# Patient Record
Sex: MALE | Race: ASIAN | NOT HISPANIC OR LATINO
[De-identification: names, ages, dates, MRNs, and addresses within clinical notes are randomized per-mention and may not be internally consistent; named-entity substitution may affect disease eponyms.]

---

## 2019-07-22 ENCOUNTER — APPOINTMENT (OUTPATIENT)
Dept: CARDIOLOGY | Facility: CLINIC | Age: 74
End: 2019-07-22

## 2019-07-22 PROBLEM — Z00.00 ENCOUNTER FOR PREVENTIVE HEALTH EXAMINATION: Status: ACTIVE | Noted: 2019-07-22

## 2021-04-09 ENCOUNTER — APPOINTMENT (OUTPATIENT)
Dept: UROLOGY | Facility: CLINIC | Age: 76
End: 2021-04-09
Payer: MEDICARE

## 2021-04-09 VITALS
OXYGEN SATURATION: 96 % | TEMPERATURE: 97.4 F | SYSTOLIC BLOOD PRESSURE: 147 MMHG | WEIGHT: 136.68 LBS | HEIGHT: 67 IN | BODY MASS INDEX: 21.45 KG/M2 | DIASTOLIC BLOOD PRESSURE: 66 MMHG | HEART RATE: 79 BPM | RESPIRATION RATE: 16 BRPM

## 2021-04-09 DIAGNOSIS — Z78.9 OTHER SPECIFIED HEALTH STATUS: ICD-10-CM

## 2021-04-09 DIAGNOSIS — Z80.49 FAMILY HISTORY OF MALIGNANT NEOPLASM OF OTHER GENITAL ORGANS: ICD-10-CM

## 2021-04-09 DIAGNOSIS — Z86.39 PERSONAL HISTORY OF OTHER ENDOCRINE, NUTRITIONAL AND METABOLIC DISEASE: ICD-10-CM

## 2021-04-09 DIAGNOSIS — Z86.79 PERSONAL HISTORY OF OTHER DISEASES OF THE CIRCULATORY SYSTEM: ICD-10-CM

## 2021-04-09 PROCEDURE — 99203 OFFICE O/P NEW LOW 30 MIN: CPT

## 2021-04-09 RX ORDER — FINASTERIDE 5 MG/1
5 TABLET, FILM COATED ORAL
Qty: 90 | Refills: 3 | Status: ACTIVE | COMMUNITY
Start: 2021-04-09 | End: 1900-01-01

## 2021-04-11 PROBLEM — Z80.49 FAMILY HISTORY OF CERVICAL CANCER: Status: ACTIVE | Noted: 2021-04-09

## 2021-04-11 PROBLEM — Z86.79 HISTORY OF HYPERTENSION: Status: RESOLVED | Noted: 2021-04-09 | Resolved: 2021-04-11

## 2021-04-11 PROBLEM — Z86.39 HISTORY OF DIABETES MELLITUS: Status: RESOLVED | Noted: 2021-04-09 | Resolved: 2021-04-11

## 2021-04-11 PROBLEM — Z78.9 NON-SMOKER: Status: ACTIVE | Noted: 2021-04-09

## 2021-04-11 PROBLEM — Z78.9 NO HISTORY OF ALCOHOL USE: Status: ACTIVE | Noted: 2021-04-09

## 2021-04-11 NOTE — ASSESSMENT
[FreeTextEntry1] : 74 yo  with BPH, acute urinary retention\par \par Natural history of BPH and bladder outlet obstruction reviewed, risks of progression, risks of detrussor myopathy/areflexic bladder, bladder stones, UTI, worsening symptoms, risk of retention and other issues. \par \par All treatment options were reviewed. This included surveillance, all medical therapeutic options, all outlet procedures including office based, TURP, bipolar TURP, button vaporization, thulium/holmium, suprapubic/retropubic simple (open, robotic) prostatectomy. \par \par All potential side effects of treatment were reviewed including, but not limited to: short term or permanent stress urinary incontinence and/or short term or permanent erectile dysfunction, penile shortening, rectal symptoms/pain, perineal pain, risks of conversion from MIS to open surgery discussed, bleeding//life-threatening hemorrhage, rectal injury requiring colostomy or delayed recognition leading to fistula, vascular/bowel/adjacent visceral organ injury, trocar/access injury, the possibility of recognized vs. unrecognized/delayed-recognition injury, risks of thermal/blunt/sharp/retraction injury, risks of DVT, PE, MI, death, risks of cardiopulmonary/anesthesia related complications, positional injury, infection/collection/abscess, wound complications/dehiscence/seroma/cellulitis, urinoma/fistula, ureteral injury/obstruction, bladder neck contracture, penile shortening, meatal stenosis, urethral stricture, prolonged vesicostomy or capsular leak, and other complications.\par \par - Will start patient on dual medical therapy - increase tamsulosin to 08mg and start finasteride\par - TOV in 10 days

## 2021-04-11 NOTE — PHYSICAL EXAM
[General Appearance - Well Developed] : well developed [General Appearance - Well Nourished] : well nourished [Normal Appearance] : normal appearance [Well Groomed] : well groomed [General Appearance - In No Acute Distress] : no acute distress [Edema] : no peripheral edema [Respiration, Rhythm And Depth] : normal respiratory rhythm and effort [Exaggerated Use Of Accessory Muscles For Inspiration] : no accessory muscle use [Abdomen Soft] : soft [Abdomen Tenderness] : non-tender [Costovertebral Angle Tenderness] : no ~M costovertebral angle tenderness [Urethral Meatus] : meatus normal [Penis Abnormality] : normal circumcised penis [Urinary Bladder Findings] : the bladder was normal on palpation [Scrotum] : the scrotum was normal [Epididymis] : the epididymides were normal [Testes Tenderness] : no tenderness of the testes [Testes Mass (___cm)] : there were no testicular masses [FreeTextEntry1] : Brooks draining clear yellow urine [Normal Station and Gait] : the gait and station were normal for the patient's age [] : no rash [No Focal Deficits] : no focal deficits [Oriented To Time, Place, And Person] : oriented to person, place, and time [Affect] : the affect was normal [Mood] : the mood was normal [Not Anxious] : not anxious [No Palpable Adenopathy] : no palpable adenopathy

## 2021-04-11 NOTE — REVIEW OF SYSTEMS
[Eyesight Problems] : eyesight problems [Pain during urination] : pain during urination [Urine retention] : urine retention [Wake up at night to urinate  How many times?  ___] : wakes up to urinate [unfilled] times during the night [Wait a long time to urinate] : waits a long time to urinate [Dizziness] : dizziness [Negative] : Heme/Lymph [FreeTextEntry3] : Frequent urination

## 2021-04-11 NOTE — HISTORY OF PRESENT ILLNESS
[FreeTextEntry1] : 76 yo Faroese speaking M presents with longstanding history of BPH\par Multiple episodes of acute urinary retention\par First episode was about 7-8 months ago\par Second episode was 3 months ago\par At that time, underwent TUMT in the office of an outside urologist (Dr. Hernandez)\par Obstructive LUTS didn't improve much afterwards\par Most recently about 2 days ago, wasn't able to urinate\par Went to urgent care who was able to place a daly catheter\par Throughout this whole time, has just been taking tamsulosin 0.4mg

## 2021-04-21 ENCOUNTER — APPOINTMENT (OUTPATIENT)
Age: 76
End: 2021-04-21
Payer: MEDICARE

## 2021-04-21 VITALS — TEMPERATURE: 98 F

## 2021-04-21 PROCEDURE — 51700 IRRIGATION OF BLADDER: CPT

## 2021-04-21 PROCEDURE — 99214 OFFICE O/P EST MOD 30 MIN: CPT | Mod: 25

## 2021-04-21 PROCEDURE — A4216: CPT | Mod: NC

## 2021-04-25 NOTE — ASSESSMENT
[FreeTextEntry1] : 76 yo M with BPH, urinary retention\par \par - FU in 1 week for repeat bladder scan\par - Continue dual medical therapy\par - Reviewed options again for his BPH including surgical intervention. Pt will continue to consider them.

## 2021-04-25 NOTE — PHYSICAL EXAM
[General Appearance - Well Developed] : well developed [General Appearance - Well Nourished] : well nourished [Normal Appearance] : normal appearance [Well Groomed] : well groomed [General Appearance - In No Acute Distress] : no acute distress [Abdomen Soft] : soft [Abdomen Tenderness] : non-tender [Costovertebral Angle Tenderness] : no ~M costovertebral angle tenderness [Urethral Meatus] : meatus normal [Penis Abnormality] : normal circumcised penis [Urinary Bladder Findings] : the bladder was normal on palpation [Scrotum] : the scrotum was normal [Epididymis] : the epididymides were normal [Testes Tenderness] : no tenderness of the testes [Testes Mass (___cm)] : there were no testicular masses [FreeTextEntry1] : Brooks draining clear yellow urine [Edema] : no peripheral edema [] : no respiratory distress [Respiration, Rhythm And Depth] : normal respiratory rhythm and effort [Exaggerated Use Of Accessory Muscles For Inspiration] : no accessory muscle use [Oriented To Time, Place, And Person] : oriented to person, place, and time [Affect] : the affect was normal [Mood] : the mood was normal [Not Anxious] : not anxious [Normal Station and Gait] : the gait and station were normal for the patient's age [No Focal Deficits] : no focal deficits [No Palpable Adenopathy] : no palpable adenopathy

## 2021-04-25 NOTE — HISTORY OF PRESENT ILLNESS
[FreeTextEntry1] : 76 yo Spanish speaking M presents with longstanding history of BPH\par Multiple episodes of acute urinary retention\par First episode was about 7-8 months ago\par Second episode was 3 months ago\par At that time, underwent TUMT in the office of an outside urologist (Dr. Hernandez)\par Obstructive LUTS didn't improve much afterwards\par Most recently about 2 days ago, wasn't able to urinate\par Went to urgent care who was able to place a daly catheter\par Throughout this whole time, has just been taking tamsulosin 0.4mg\par \par 4/21/21 Interval history: No issues since last visit\par Started finasteride and increased tamsulosin\par Passed TOV today

## 2021-05-05 ENCOUNTER — APPOINTMENT (OUTPATIENT)
Dept: UROLOGY | Facility: CLINIC | Age: 76
End: 2021-05-05
Payer: MEDICARE

## 2021-05-05 VITALS
SYSTOLIC BLOOD PRESSURE: 153 MMHG | HEART RATE: 66 BPM | TEMPERATURE: 97.5 F | RESPIRATION RATE: 16 BRPM | OXYGEN SATURATION: 97 % | DIASTOLIC BLOOD PRESSURE: 73 MMHG

## 2021-05-05 PROCEDURE — 99214 OFFICE O/P EST MOD 30 MIN: CPT | Mod: 25

## 2021-05-05 PROCEDURE — 52000 CYSTOURETHROSCOPY: CPT

## 2021-05-07 NOTE — HISTORY OF PRESENT ILLNESS
[FreeTextEntry1] : 74 yo Polish speaking M presents with longstanding history of BPH\par Multiple episodes of acute urinary retention\par First episode was about 7-8 months ago\par Second episode was 3 months ago\par At that time, underwent TUMT in the office of an outside urologist (Dr. Hernandez)\par Obstructive LUTS didn't improve much afterwards\par Most recently about 2 days ago, wasn't able to urinate\par Went to urgent care who was able to place a daly catheter\par Throughout this whole time, has just been taking tamsulosin 0.4mg\par \par 4/21/21 Interval history: No issues since last visit\par Started finasteride and increased tamsulosin\par Passed TOV today\par \par 5/5/21 Interval history: The day after pt passed TOV, went into retention again\par Went to ER and had catheter replaced\par Cystoscopy today confirmed enlarged prostate with intravesical component\par

## 2021-05-07 NOTE — ASSESSMENT
[FreeTextEntry1] : 76 yo M with BPH, urinary retention\par \par - Reviewed today's cysto findings with pt\par - Discussed options with pt. Pt has been dealing intermittently with urinary retention for almost a year now. Natural history of BPH and bladder outlet obstruction reviewed, risks of progression, risks of detrussor myopathy/areflexic bladder, bladder stones, UTI, worsening symptoms, risk of retention and other issues. \par All treatment options were reviewed. This included surveillance, all medical therapeutic options, all outlet procedures including office based, TURP, bipolar TURP, button vaporization, thulium/holmium, suprapubic/retropubic simple (open, robotic) prostatectomy. \par \par All potential side effects of treatment were reviewed including, but not limited to: short term or permanent stress urinary incontinence and/or short term or permanent erectile dysfunction, penile shortening, rectal symptoms/pain, perineal pain, risks of conversion from MIS to open surgery discussed, bleeding//life-threatening hemorrhage, rectal injury requiring colostomy or delayed recognition leading to fistula, vascular/bowel/adjacent visceral organ injury, trocar/access injury, the possibility of recognized vs. unrecognized/delayed-recognition injury, risks of thermal/blunt/sharp/retraction injury, risks of DVT, PE, MI, death, risks of cardiopulmonary/anesthesia related complications, positional injury, infection/collection/abscess, wound complications/dehiscence/seroma/cellulitis, urinoma/fistula, ureteral injury/obstruction, bladder neck contracture, penile shortening, meatal stenosis, urethral stricture, prolonged vesicostomy or capsular leak, and other complications.\par \par Will schedule TURP in the near future\par - Medical and cardiac clearance

## 2021-05-07 NOTE — PHYSICAL EXAM
[General Appearance - Well Developed] : well developed [General Appearance - Well Nourished] : well nourished [Normal Appearance] : normal appearance [Well Groomed] : well groomed [General Appearance - In No Acute Distress] : no acute distress [Abdomen Soft] : soft [Abdomen Tenderness] : non-tender [Costovertebral Angle Tenderness] : no ~M costovertebral angle tenderness [Urethral Meatus] : meatus normal [Penis Abnormality] : normal circumcised penis [Urinary Bladder Findings] : the bladder was normal on palpation [Scrotum] : the scrotum was normal [Epididymis] : the epididymides were normal [Testes Tenderness] : no tenderness of the testes [Testes Mass (___cm)] : there were no testicular masses [Prostate Tenderness] : the prostate was not tender [No Prostate Nodules] : no prostate nodules [Prostate Size ___ gm] : prostate size [unfilled] gm [FreeTextEntry1] : Brooks draining clear yellow urine [Edema] : no peripheral edema [] : no respiratory distress [Respiration, Rhythm And Depth] : normal respiratory rhythm and effort [Exaggerated Use Of Accessory Muscles For Inspiration] : no accessory muscle use [Oriented To Time, Place, And Person] : oriented to person, place, and time [Affect] : the affect was normal [Mood] : the mood was normal [Not Anxious] : not anxious [Normal Station and Gait] : the gait and station were normal for the patient's age [No Focal Deficits] : no focal deficits [No Palpable Adenopathy] : no palpable adenopathy

## 2021-05-13 ENCOUNTER — OUTPATIENT (OUTPATIENT)
Dept: OUTPATIENT SERVICES | Facility: HOSPITAL | Age: 76
LOS: 1 days | End: 2021-05-13
Payer: MEDICARE

## 2021-05-13 VITALS
TEMPERATURE: 98 F | HEART RATE: 90 BPM | SYSTOLIC BLOOD PRESSURE: 150 MMHG | HEIGHT: 64 IN | WEIGHT: 130.07 LBS | RESPIRATION RATE: 16 BRPM | OXYGEN SATURATION: 98 % | DIASTOLIC BLOOD PRESSURE: 69 MMHG

## 2021-05-13 DIAGNOSIS — Z01.818 ENCOUNTER FOR OTHER PREPROCEDURAL EXAMINATION: ICD-10-CM

## 2021-05-13 DIAGNOSIS — N40.1 BENIGN PROSTATIC HYPERPLASIA WITH LOWER URINARY TRACT SYMPTOMS: ICD-10-CM

## 2021-05-13 DIAGNOSIS — I10 ESSENTIAL (PRIMARY) HYPERTENSION: ICD-10-CM

## 2021-05-13 DIAGNOSIS — E11.9 TYPE 2 DIABETES MELLITUS WITHOUT COMPLICATIONS: ICD-10-CM

## 2021-05-13 DIAGNOSIS — Z90.49 ACQUIRED ABSENCE OF OTHER SPECIFIED PARTS OF DIGESTIVE TRACT: Chronic | ICD-10-CM

## 2021-05-13 LAB
A1C WITH ESTIMATED AVERAGE GLUCOSE RESULT: 6.3 % — HIGH (ref 4–5.6)
ANION GAP SERPL CALC-SCNC: 14 MMOL/L — SIGNIFICANT CHANGE UP (ref 5–17)
BLD GP AB SCN SERPL QL: NEGATIVE — SIGNIFICANT CHANGE UP
BUN SERPL-MCNC: 15 MG/DL — SIGNIFICANT CHANGE UP (ref 7–23)
CALCIUM SERPL-MCNC: 9.7 MG/DL — SIGNIFICANT CHANGE UP (ref 8.4–10.5)
CHLORIDE SERPL-SCNC: 103 MMOL/L — SIGNIFICANT CHANGE UP (ref 96–108)
CO2 SERPL-SCNC: 21 MMOL/L — LOW (ref 22–31)
CREAT SERPL-MCNC: 0.76 MG/DL — SIGNIFICANT CHANGE UP (ref 0.5–1.3)
ESTIMATED AVERAGE GLUCOSE: 134 MG/DL — HIGH (ref 68–114)
GLUCOSE SERPL-MCNC: 129 MG/DL — HIGH (ref 70–99)
HCT VFR BLD CALC: 45.9 % — SIGNIFICANT CHANGE UP (ref 39–50)
HGB BLD-MCNC: 15.9 G/DL — SIGNIFICANT CHANGE UP (ref 13–17)
MCHC RBC-ENTMCNC: 29.9 PG — SIGNIFICANT CHANGE UP (ref 27–34)
MCHC RBC-ENTMCNC: 34.6 GM/DL — SIGNIFICANT CHANGE UP (ref 32–36)
MCV RBC AUTO: 86.4 FL — SIGNIFICANT CHANGE UP (ref 80–100)
NRBC # BLD: 0 /100 WBCS — SIGNIFICANT CHANGE UP (ref 0–0)
PLATELET # BLD AUTO: 241 K/UL — SIGNIFICANT CHANGE UP (ref 150–400)
POTASSIUM SERPL-MCNC: 3.5 MMOL/L — SIGNIFICANT CHANGE UP (ref 3.5–5.3)
POTASSIUM SERPL-SCNC: 3.5 MMOL/L — SIGNIFICANT CHANGE UP (ref 3.5–5.3)
RBC # BLD: 5.31 M/UL — SIGNIFICANT CHANGE UP (ref 4.2–5.8)
RBC # FLD: 13.1 % — SIGNIFICANT CHANGE UP (ref 10.3–14.5)
RH IG SCN BLD-IMP: POSITIVE — SIGNIFICANT CHANGE UP
SODIUM SERPL-SCNC: 138 MMOL/L — SIGNIFICANT CHANGE UP (ref 135–145)
WBC # BLD: 7.43 K/UL — SIGNIFICANT CHANGE UP (ref 3.8–10.5)
WBC # FLD AUTO: 7.43 K/UL — SIGNIFICANT CHANGE UP (ref 3.8–10.5)

## 2021-05-13 PROCEDURE — 85027 COMPLETE CBC AUTOMATED: CPT

## 2021-05-13 PROCEDURE — G0463: CPT

## 2021-05-13 PROCEDURE — 86850 RBC ANTIBODY SCREEN: CPT

## 2021-05-13 PROCEDURE — 80048 BASIC METABOLIC PNL TOTAL CA: CPT

## 2021-05-13 PROCEDURE — 87086 URINE CULTURE/COLONY COUNT: CPT

## 2021-05-13 PROCEDURE — 83036 HEMOGLOBIN GLYCOSYLATED A1C: CPT

## 2021-05-13 PROCEDURE — 86900 BLOOD TYPING SEROLOGIC ABO: CPT

## 2021-05-13 PROCEDURE — 86901 BLOOD TYPING SEROLOGIC RH(D): CPT

## 2021-05-13 RX ORDER — CHLORHEXIDINE GLUCONATE 213 G/1000ML
1 SOLUTION TOPICAL ONCE
Refills: 0 | Status: DISCONTINUED | OUTPATIENT
Start: 2021-05-18 | End: 2021-05-18

## 2021-05-13 RX ORDER — OLMESARTAN MEDOXOMIL 5 MG/1
1 TABLET, FILM COATED ORAL
Qty: 0 | Refills: 0 | DISCHARGE

## 2021-05-13 RX ORDER — SODIUM CHLORIDE 9 MG/ML
3 INJECTION INTRAMUSCULAR; INTRAVENOUS; SUBCUTANEOUS EVERY 8 HOURS
Refills: 0 | Status: DISCONTINUED | OUTPATIENT
Start: 2021-05-18 | End: 2021-05-18

## 2021-05-13 RX ORDER — CEFAZOLIN SODIUM 1 G
2000 VIAL (EA) INJECTION ONCE
Refills: 0 | Status: DISCONTINUED | OUTPATIENT
Start: 2021-05-18 | End: 2021-05-18

## 2021-05-13 NOTE — H&P PST ADULT - NSICDXPROBLEM_GEN_ALL_CORE_FT
PROBLEM DIAGNOSES  Problem: Enlarged prostate with lower urinary tract symptoms (LUTS)  Assessment and Plan: Labs  Pre Op education discussed       PROBLEM DIAGNOSES  Problem: Enlarged prostate with lower urinary tract symptoms (LUTS)  Assessment and Plan: Labs  Pre Op education discussed    Problem: DM (diabetes mellitus)  Assessment and Plan: Last dose of Metformin 05/16/2021 PM    Problem: HTN (hypertension)  Assessment and Plan: Continue on antihypertensive medication

## 2021-05-13 NOTE — H&P PST ADULT - NSICDXPASTMEDICALHX_GEN_ALL_CORE_FT
PAST MEDICAL HISTORY:  DM (diabetes mellitus), type 2     History of BPH     HTN (hypertension)

## 2021-05-13 NOTE — H&P PST ADULT - HISTORY OF PRESENT ILLNESS
74y/o male pt with hx BPH, HTN, DM with c/o urinary retention    Scheduled for transurethral resection of prostate on 05/18/2021    Denies Recent travel, Exposure or Covid symptoms  Covid PCR Test on 05/15/2021  74 yo Croatian speaking M presents with longstanding history of HTN, DM, BPH c/o multiple episodes of acute urinary retention since 8 months ago , underwent TUMT in the office of an outside urologist (Dr. Hernandez). Went to urgent care as was not able to urinate .They placed a daly catheter. The day after pt passed TOV, went into retention again. Went to ER and had catheter replaced . Cystoscopy  confirmed enlarged prostate with intravesical component.     Scheduled for transurethral resection of prostate on 05/18/2021    Denies Recent travel, Exposure or Covid symptoms  Covid PCR Test on 05/15/2021

## 2021-05-15 ENCOUNTER — OUTPATIENT (OUTPATIENT)
Dept: OUTPATIENT SERVICES | Facility: HOSPITAL | Age: 76
LOS: 1 days | End: 2021-05-15
Payer: MEDICARE

## 2021-05-15 DIAGNOSIS — Z11.52 ENCOUNTER FOR SCREENING FOR COVID-19: ICD-10-CM

## 2021-05-15 DIAGNOSIS — Z90.49 ACQUIRED ABSENCE OF OTHER SPECIFIED PARTS OF DIGESTIVE TRACT: Chronic | ICD-10-CM

## 2021-05-15 LAB
CULTURE RESULTS: SIGNIFICANT CHANGE UP
SARS-COV-2 RNA SPEC QL NAA+PROBE: SIGNIFICANT CHANGE UP
SPECIMEN SOURCE: SIGNIFICANT CHANGE UP

## 2021-05-15 PROCEDURE — C9803: CPT

## 2021-05-15 PROCEDURE — U0003: CPT

## 2021-05-15 PROCEDURE — U0005: CPT

## 2021-05-17 ENCOUNTER — TRANSCRIPTION ENCOUNTER (OUTPATIENT)
Age: 76
End: 2021-05-17

## 2021-05-18 ENCOUNTER — APPOINTMENT (OUTPATIENT)
Age: 76
End: 2021-05-18

## 2021-05-18 ENCOUNTER — OUTPATIENT (OUTPATIENT)
Dept: OUTPATIENT SERVICES | Facility: HOSPITAL | Age: 76
LOS: 1 days | End: 2021-05-18
Payer: MEDICARE

## 2021-05-18 ENCOUNTER — RESULT REVIEW (OUTPATIENT)
Age: 76
End: 2021-05-18

## 2021-05-18 VITALS
SYSTOLIC BLOOD PRESSURE: 135 MMHG | TEMPERATURE: 98 F | WEIGHT: 130.07 LBS | HEART RATE: 82 BPM | DIASTOLIC BLOOD PRESSURE: 77 MMHG | OXYGEN SATURATION: 98 % | HEIGHT: 64 IN | RESPIRATION RATE: 14 BRPM

## 2021-05-18 DIAGNOSIS — N40.1 BENIGN PROSTATIC HYPERPLASIA WITH LOWER URINARY TRACT SYMPTOMS: ICD-10-CM

## 2021-05-18 DIAGNOSIS — Z90.49 ACQUIRED ABSENCE OF OTHER SPECIFIED PARTS OF DIGESTIVE TRACT: Chronic | ICD-10-CM

## 2021-05-18 LAB
GLUCOSE BLDC GLUCOMTR-MCNC: 130 MG/DL — HIGH (ref 70–99)
GLUCOSE BLDC GLUCOMTR-MCNC: 153 MG/DL — HIGH (ref 70–99)
GLUCOSE BLDC GLUCOMTR-MCNC: 188 MG/DL — HIGH (ref 70–99)
RH IG SCN BLD-IMP: POSITIVE — SIGNIFICANT CHANGE UP

## 2021-05-18 PROCEDURE — 52601 PROSTATECTOMY (TURP): CPT

## 2021-05-18 PROCEDURE — 88305 TISSUE EXAM BY PATHOLOGIST: CPT | Mod: 26

## 2021-05-18 RX ORDER — LOSARTAN POTASSIUM 100 MG/1
50 TABLET, FILM COATED ORAL DAILY
Refills: 0 | Status: DISCONTINUED | OUTPATIENT
Start: 2021-05-18 | End: 2021-06-01

## 2021-05-18 RX ORDER — INSULIN LISPRO 100/ML
VIAL (ML) SUBCUTANEOUS
Refills: 0 | Status: DISCONTINUED | OUTPATIENT
Start: 2021-05-18 | End: 2021-06-01

## 2021-05-18 RX ORDER — SODIUM CHLORIDE 9 MG/ML
1000 INJECTION, SOLUTION INTRAVENOUS
Refills: 0 | Status: DISCONTINUED | OUTPATIENT
Start: 2021-05-18 | End: 2021-06-01

## 2021-05-18 RX ORDER — TAMSULOSIN HYDROCHLORIDE 0.4 MG/1
0.8 CAPSULE ORAL AT BEDTIME
Refills: 0 | Status: DISCONTINUED | OUTPATIENT
Start: 2021-05-18 | End: 2021-06-01

## 2021-05-18 RX ORDER — ACETAMINOPHEN 500 MG
650 TABLET ORAL EVERY 4 HOURS
Refills: 0 | Status: DISCONTINUED | OUTPATIENT
Start: 2021-05-18 | End: 2021-06-01

## 2021-05-18 RX ORDER — FINASTERIDE 5 MG/1
5 TABLET, FILM COATED ORAL DAILY
Refills: 0 | Status: DISCONTINUED | OUTPATIENT
Start: 2021-05-18 | End: 2021-06-01

## 2021-05-18 RX ORDER — METFORMIN HYDROCHLORIDE 850 MG/1
1 TABLET ORAL
Qty: 0 | Refills: 0 | DISCHARGE

## 2021-05-18 RX ORDER — POLYETHYLENE GLYCOL 3350 17 G/17G
17 POWDER, FOR SOLUTION ORAL DAILY
Refills: 0 | Status: DISCONTINUED | OUTPATIENT
Start: 2021-05-18 | End: 2021-06-01

## 2021-05-18 RX ORDER — HYDROMORPHONE HYDROCHLORIDE 2 MG/ML
0.25 INJECTION INTRAMUSCULAR; INTRAVENOUS; SUBCUTANEOUS
Refills: 0 | Status: DISCONTINUED | OUTPATIENT
Start: 2021-05-18 | End: 2021-05-19

## 2021-05-18 RX ORDER — CEPHALEXIN 500 MG
1 CAPSULE ORAL
Qty: 9 | Refills: 0
Start: 2021-05-18 | End: 2021-05-20

## 2021-05-18 RX ORDER — AMLODIPINE BESYLATE 2.5 MG/1
1 TABLET ORAL
Qty: 0 | Refills: 0 | DISCHARGE

## 2021-05-18 RX ORDER — FINASTERIDE 5 MG/1
1 TABLET, FILM COATED ORAL
Qty: 0 | Refills: 0 | DISCHARGE

## 2021-05-18 RX ORDER — TAMSULOSIN HYDROCHLORIDE 0.4 MG/1
2 CAPSULE ORAL
Qty: 0 | Refills: 0 | DISCHARGE

## 2021-05-18 RX ORDER — OLMESARTAN MEDOXOMIL 5 MG/1
1 TABLET, FILM COATED ORAL
Qty: 0 | Refills: 0 | DISCHARGE

## 2021-05-18 RX ORDER — HYDROMORPHONE HYDROCHLORIDE 2 MG/ML
0.5 INJECTION INTRAMUSCULAR; INTRAVENOUS; SUBCUTANEOUS
Refills: 0 | Status: DISCONTINUED | OUTPATIENT
Start: 2021-05-18 | End: 2021-05-19

## 2021-05-18 RX ORDER — INSULIN LISPRO 100/ML
VIAL (ML) SUBCUTANEOUS AT BEDTIME
Refills: 0 | Status: DISCONTINUED | OUTPATIENT
Start: 2021-05-18 | End: 2021-06-01

## 2021-05-18 RX ORDER — GLUCAGON INJECTION, SOLUTION 0.5 MG/.1ML
1 INJECTION, SOLUTION SUBCUTANEOUS ONCE
Refills: 0 | Status: DISCONTINUED | OUTPATIENT
Start: 2021-05-18 | End: 2021-06-01

## 2021-05-18 RX ORDER — ACETAMINOPHEN 500 MG
1000 TABLET ORAL ONCE
Refills: 0 | Status: COMPLETED | OUTPATIENT
Start: 2021-05-18 | End: 2021-05-18

## 2021-05-18 RX ORDER — CEFAZOLIN SODIUM 1 G
2000 VIAL (EA) INJECTION EVERY 8 HOURS
Refills: 0 | Status: DISCONTINUED | OUTPATIENT
Start: 2021-05-18 | End: 2021-06-01

## 2021-05-18 RX ORDER — DEXTROSE 50 % IN WATER 50 %
12.5 SYRINGE (ML) INTRAVENOUS ONCE
Refills: 0 | Status: DISCONTINUED | OUTPATIENT
Start: 2021-05-18 | End: 2021-06-01

## 2021-05-18 RX ORDER — LIDOCAINE HCL 20 MG/ML
0.2 VIAL (ML) INJECTION ONCE
Refills: 0 | Status: COMPLETED | OUTPATIENT
Start: 2021-05-18 | End: 2021-05-18

## 2021-05-18 RX ORDER — AMLODIPINE BESYLATE 2.5 MG/1
10 TABLET ORAL DAILY
Refills: 0 | Status: DISCONTINUED | OUTPATIENT
Start: 2021-05-18 | End: 2021-06-01

## 2021-05-18 RX ORDER — DEXTROSE 50 % IN WATER 50 %
15 SYRINGE (ML) INTRAVENOUS ONCE
Refills: 0 | Status: DISCONTINUED | OUTPATIENT
Start: 2021-05-18 | End: 2021-06-01

## 2021-05-18 RX ORDER — DEXTROSE 50 % IN WATER 50 %
25 SYRINGE (ML) INTRAVENOUS ONCE
Refills: 0 | Status: DISCONTINUED | OUTPATIENT
Start: 2021-05-18 | End: 2021-06-01

## 2021-05-18 RX ORDER — SENNA PLUS 8.6 MG/1
2 TABLET ORAL AT BEDTIME
Refills: 0 | Status: DISCONTINUED | OUTPATIENT
Start: 2021-05-18 | End: 2021-06-01

## 2021-05-18 RX ORDER — OXYCODONE AND ACETAMINOPHEN 5; 325 MG/1; MG/1
1 TABLET ORAL EVERY 4 HOURS
Refills: 0 | Status: DISCONTINUED | OUTPATIENT
Start: 2021-05-18 | End: 2021-05-18

## 2021-05-18 RX ADMIN — Medication 100 MILLIGRAM(S): at 20:47

## 2021-05-18 RX ADMIN — SENNA PLUS 2 TABLET(S): 8.6 TABLET ORAL at 22:00

## 2021-05-18 RX ADMIN — TAMSULOSIN HYDROCHLORIDE 0.8 MILLIGRAM(S): 0.4 CAPSULE ORAL at 22:00

## 2021-05-18 RX ADMIN — HYDROMORPHONE HYDROCHLORIDE 0.5 MILLIGRAM(S): 2 INJECTION INTRAMUSCULAR; INTRAVENOUS; SUBCUTANEOUS at 18:10

## 2021-05-18 RX ADMIN — Medication 1000 MILLIGRAM(S): at 19:36

## 2021-05-18 RX ADMIN — Medication 2: at 18:16

## 2021-05-18 RX ADMIN — HYDROMORPHONE HYDROCHLORIDE 0.5 MILLIGRAM(S): 2 INJECTION INTRAMUSCULAR; INTRAVENOUS; SUBCUTANEOUS at 18:29

## 2021-05-18 RX ADMIN — Medication 400 MILLIGRAM(S): at 19:00

## 2021-05-18 NOTE — PROGRESS NOTE ADULT - SUBJECTIVE AND OBJECTIVE BOX
Post op Check  Faroese  ID#: [id number]  Pt seen and examined without complaints. Pain is controlled. Denies SOB/CP/N/V.     Vital Signs Last 24 Hrs  T(C): 36.5 (18 May 2021 20:15), Max: 36.5 (18 May 2021 12:05)  T(F): 97.7 (18 May 2021 20:15), Max: 97.7 (18 May 2021 12:05)  HR: 92 (18 May 2021 20:15) (82 - 109)  BP: 123/61 (18 May 2021 20:15) (116/62 - 145/70)  BP(mean): 85 (18 May 2021 20:15) (81 - 100)  RR: 16 (18 May 2021 20:15) (14 - 16)  SpO2: 100% (18 May 2021 20:15) (94% - 100%)    I&O's Summary    18 May 2021 07:01  -  18 May 2021 21:36  --------------------------------------------------------  IN: 450 mL / OUT: 0 mL / NET: 450 mL        Physical Exam  Gen: NAD, A&Ox3  Pulm: No respiratory distress, no subcostal retractions  Abd: Soft, NT, ND  : 3way secured. on CBI mod-fast gtt with pink output. no clots in tubing

## 2021-05-18 NOTE — ASU DISCHARGE PLAN (ADULT/PEDIATRIC) - CARE PROVIDER_API CALL
Soha Gilbert (MD; MPH)  Urology  [address] [facility] [address] [place] [address]  Phone: [phone]  Fax: [phone]  Follow Up Time:

## 2021-05-18 NOTE — ASU PREOP CHECKLIST - DENTURES
Patient has upper dentures, Placed in a denture cup and in his belongings/yes
s/p cardiac arrest, stemi in the field, transmitted EKG PTA, on arrival, intubated unresponsive, card at bedside. ekg with no stemi here.going to cath.

## 2021-05-18 NOTE — ASU DISCHARGE PLAN (ADULT/PEDIATRIC) - ASU DC SPECIAL INSTRUCTIONSFT
MEDICATIONS: You may take 650 mg of Tylenol every 4-6 hours as needed for pain. Do not exceed more than 4000mg or 4 grams of Tylenol daily. Continue all home medications. Avoid constipation. Take over the counter stool softeners as needed    ANTIBIOTICS: Please complete the course of antibiotics sent to your pharmacy as instructed.    BATHING: Please do not submerge underwater (example swimming/bathing) while daly in place    ACTIVITY: No heavy lifting or straining for 4-6 weeks. Walking indoors/outdoors and walking up/down stairs is okay.    DIET: Return to your usual diet. Drink plenty of fluids    DALY CARE: Empty daly bag as needed. Blood in the urine is expected and will improve. If urine becomes bright red and/or clots, please call the office. If daly not draining and worsening abdominal pain, please go to the ER    NOTIFY YOUR SURGEON IF: You have any bleeding that does not stop, any fevers (over 100.4F) or chills, persistent nausea/vomiting, pain controlled on pain medications    FOLLOW UP:  1. Please call to schedule an appointment with Dr. Gilbert in 1 week for daly removal

## 2021-05-18 NOTE — ASU DISCHARGE PLAN (ADULT/PEDIATRIC) - CALL YOUR DOCTOR IF YOU HAVE ANY OF THE FOLLOWING:
daly not draining/Bleeding that does not stop/Pain not relieved by Medications/Fever greater than (need to indicate Fahrenheit or Celsius)/Nausea and vomiting that does not stop/Inability to tolerate liquids or foods

## 2021-05-18 NOTE — ASU PREOP CHECKLIST - WAS PATIENT ON BETA BLOCKER?
Results given to patient.    ----- Message from GURWINDER Burdick sent at 10/31/2019  9:14 AM CDT -----  Mg, K and Cr stable for monitoring while on flecainide.  CL level was slightly elevated but stable when compare to prior trends.    
No

## 2021-05-19 ENCOUNTER — APPOINTMENT (OUTPATIENT)
Age: 76
End: 2021-05-19
Payer: MEDICARE

## 2021-05-19 VITALS
DIASTOLIC BLOOD PRESSURE: 64 MMHG | RESPIRATION RATE: 18 BRPM | TEMPERATURE: 99 F | SYSTOLIC BLOOD PRESSURE: 117 MMHG | OXYGEN SATURATION: 100 % | HEART RATE: 98 BPM

## 2021-05-19 VITALS
HEART RATE: 107 BPM | OXYGEN SATURATION: 97 % | SYSTOLIC BLOOD PRESSURE: 131 MMHG | RESPIRATION RATE: 18 BRPM | DIASTOLIC BLOOD PRESSURE: 70 MMHG | TEMPERATURE: 98 F

## 2021-05-19 LAB
ANION GAP SERPL CALC-SCNC: 13 MMOL/L — SIGNIFICANT CHANGE UP (ref 5–17)
BUN SERPL-MCNC: 18 MG/DL — SIGNIFICANT CHANGE UP (ref 7–23)
CALCIUM SERPL-MCNC: 8.7 MG/DL — SIGNIFICANT CHANGE UP (ref 8.4–10.5)
CHLORIDE SERPL-SCNC: 103 MMOL/L — SIGNIFICANT CHANGE UP (ref 96–108)
CO2 SERPL-SCNC: 20 MMOL/L — LOW (ref 22–31)
CREAT SERPL-MCNC: 0.77 MG/DL — SIGNIFICANT CHANGE UP (ref 0.5–1.3)
GLUCOSE BLDC GLUCOMTR-MCNC: 138 MG/DL — HIGH (ref 70–99)
GLUCOSE SERPL-MCNC: 153 MG/DL — HIGH (ref 70–99)
HCT VFR BLD CALC: 39.8 % — SIGNIFICANT CHANGE UP (ref 39–50)
HGB BLD-MCNC: 13.5 G/DL — SIGNIFICANT CHANGE UP (ref 13–17)
MAGNESIUM SERPL-MCNC: 2 MG/DL — SIGNIFICANT CHANGE UP (ref 1.6–2.6)
MCHC RBC-ENTMCNC: 29.3 PG — SIGNIFICANT CHANGE UP (ref 27–34)
MCHC RBC-ENTMCNC: 33.9 GM/DL — SIGNIFICANT CHANGE UP (ref 32–36)
MCV RBC AUTO: 86.3 FL — SIGNIFICANT CHANGE UP (ref 80–100)
NRBC # BLD: 0 /100 WBCS — SIGNIFICANT CHANGE UP (ref 0–0)
PHOSPHATE SERPL-MCNC: 2.7 MG/DL — SIGNIFICANT CHANGE UP (ref 2.5–4.5)
PLATELET # BLD AUTO: 208 K/UL — SIGNIFICANT CHANGE UP (ref 150–400)
POTASSIUM SERPL-MCNC: 4 MMOL/L — SIGNIFICANT CHANGE UP (ref 3.5–5.3)
POTASSIUM SERPL-SCNC: 4 MMOL/L — SIGNIFICANT CHANGE UP (ref 3.5–5.3)
RBC # BLD: 4.61 M/UL — SIGNIFICANT CHANGE UP (ref 4.2–5.8)
RBC # FLD: 13.1 % — SIGNIFICANT CHANGE UP (ref 10.3–14.5)
SODIUM SERPL-SCNC: 136 MMOL/L — SIGNIFICANT CHANGE UP (ref 135–145)
WBC # BLD: 6.97 K/UL — SIGNIFICANT CHANGE UP (ref 3.8–10.5)
WBC # FLD AUTO: 6.97 K/UL — SIGNIFICANT CHANGE UP (ref 3.8–10.5)

## 2021-05-19 PROCEDURE — 82962 GLUCOSE BLOOD TEST: CPT

## 2021-05-19 PROCEDURE — 52601 PROSTATECTOMY (TURP): CPT

## 2021-05-19 PROCEDURE — 84100 ASSAY OF PHOSPHORUS: CPT

## 2021-05-19 PROCEDURE — 80048 BASIC METABOLIC PNL TOTAL CA: CPT

## 2021-05-19 PROCEDURE — 83735 ASSAY OF MAGNESIUM: CPT

## 2021-05-19 PROCEDURE — 88305 TISSUE EXAM BY PATHOLOGIST: CPT

## 2021-05-19 PROCEDURE — 85027 COMPLETE CBC AUTOMATED: CPT

## 2021-05-19 PROCEDURE — 99024 POSTOP FOLLOW-UP VISIT: CPT

## 2021-05-19 RX ADMIN — Medication 100 MILLIGRAM(S): at 04:53

## 2021-05-19 NOTE — ASSESSMENT
[FreeTextEntry1] : 74 yo M s/p TURP\par \par - Passed TOV today\par - FU in 2 days for repeat bladder scan

## 2021-05-19 NOTE — PROGRESS NOTE ADULT - SUBJECTIVE AND OBJECTIVE BOX
UROLOGY DAILY PROGRESS NOTE:     Subjective: Patient seen and examined at bedside. No overnight events.       Objective:  Vital signs  T(F): , Max: 98.6 (05-19-21 @ 07:30)  HR: 85 (05-19-21 @ 07:05)  BP: 118/63 (05-19-21 @ 07:05)  SpO2: 100% (05-19-21 @ 07:05)  Wt(kg): --    I&O's Summary    18 May 2021 07:01  -  19 May 2021 07:00  --------------------------------------------------------  IN: 1650 mL / OUT: 180 mL / NET: 1470 mL    19 May 2021 07:01  -  19 May 2021 07:50  --------------------------------------------------------  IN: 100 mL / OUT: 0 mL / NET: 100 mL        Gen: NAD  Pulm: No respiratory distress, no subcostal retractions  CV: RRR, no JVD  Abd: Soft, NT, ND  : CBI off- urine clear     Labs:  05-19  6.97  / 39.8  /0.77                           13.5   6.97  )-----------( 208      ( 19 May 2021 05:16 )             39.8     05-19    136  |  103  |  18  ----------------------------<  153<H>  4.0   |  20<L>  |  0.77    Ca    8.7      19 May 2021 05:16  Phos  2.7     05-19  Mg     2.0     05-19          Urine Cx:

## 2021-05-19 NOTE — PHYSICAL EXAM
[General Appearance - Well Developed] : well developed [General Appearance - Well Nourished] : well nourished [Normal Appearance] : normal appearance [Well Groomed] : well groomed [General Appearance - In No Acute Distress] : no acute distress [Abdomen Soft] : soft [Abdomen Tenderness] : non-tender [Costovertebral Angle Tenderness] : no ~M costovertebral angle tenderness [Urethral Meatus] : meatus normal [Penis Abnormality] : normal circumcised penis [Urinary Bladder Findings] : the bladder was normal on palpation [Scrotum] : the scrotum was normal [Epididymis] : the epididymides were normal [Testes Tenderness] : no tenderness of the testes [Testes Mass (___cm)] : there were no testicular masses [FreeTextEntry1] : Brooks draining red urine [Edema] : no peripheral edema [] : no respiratory distress [Respiration, Rhythm And Depth] : normal respiratory rhythm and effort [Exaggerated Use Of Accessory Muscles For Inspiration] : no accessory muscle use [Oriented To Time, Place, And Person] : oriented to person, place, and time [Affect] : the affect was normal [Mood] : the mood was normal [Not Anxious] : not anxious [Normal Station and Gait] : the gait and station were normal for the patient's age [No Focal Deficits] : no focal deficits [No Palpable Adenopathy] : no palpable adenopathy

## 2021-05-19 NOTE — PROGRESS NOTE ADULT - ASSESSMENT
A/P: 75y Male s/p bipolar TURP. 23hr observation  Ancef  continue CBI overnight. Clamp in AM.   DVT prophylaxis/OOB. Hold SQH   Incentive spirometry  Strict I&O's  Analgesia and antiemetics as needed  Diet  AM labs  Home with daly and 3d Keflex if remains clear off of CBI in AM
A/P: 75y Male s/p bipolar TURP  Ancef  CBI clamped   Analgesia and antiemetics as needed  Diet  Home with daly and 3d Keflex

## 2021-05-19 NOTE — HISTORY OF PRESENT ILLNESS
[FreeTextEntry1] : 76 yo Romansh speaking M presents with longstanding history of BPH\par Multiple episodes of acute urinary retention\par First episode was about 7-8 months ago\par Second episode was 3 months ago\par At that time, underwent TUMT in the office of an outside urologist (Dr. Hernandez)\par Obstructive LUTS didn't improve much afterwards\par Most recently about 2 days ago, wasn't able to urinate\par Went to urgent care who was able to place a daly catheter\par Throughout this whole time, has just been taking tamsulosin 0.4mg\par \par 4/21/21 Interval history: No issues since last visit\par Started finasteride and increased tamsulosin\par Passed TOV today\par \par 5/5/21 Interval history: The day after pt passed TOV, went into retention again\par Went to ER and had catheter replaced\par Cystoscopy today confirmed enlarged prostate with intravesical component\par \par 5/19/21 Interval history: Pt is s/p TURP yesterday\par Presented today with more drainage around catheter instead of through\par Irrigated catheter which was red but no clots\par Decision made to proceed with TOV today and pt was able to void\par

## 2021-05-20 PROBLEM — Z87.438 PERSONAL HISTORY OF OTHER DISEASES OF MALE GENITAL ORGANS: Chronic | Status: ACTIVE | Noted: 2021-05-13

## 2021-05-20 PROBLEM — E11.9 TYPE 2 DIABETES MELLITUS WITHOUT COMPLICATIONS: Chronic | Status: ACTIVE | Noted: 2021-05-13

## 2021-05-20 PROBLEM — I10 ESSENTIAL (PRIMARY) HYPERTENSION: Chronic | Status: ACTIVE | Noted: 2021-05-13

## 2021-05-21 ENCOUNTER — APPOINTMENT (OUTPATIENT)
Age: 76
End: 2021-05-21
Payer: MEDICARE

## 2021-05-21 ENCOUNTER — NON-APPOINTMENT (OUTPATIENT)
Age: 76
End: 2021-05-21

## 2021-05-21 VITALS — TEMPERATURE: 97.7 F

## 2021-05-21 DIAGNOSIS — K59.09 OTHER CONSTIPATION: ICD-10-CM

## 2021-05-21 PROCEDURE — 99024 POSTOP FOLLOW-UP VISIT: CPT

## 2021-05-21 RX ORDER — DOCUSATE SODIUM 100 MG/1
100 CAPSULE ORAL TWICE DAILY
Qty: 30 | Refills: 3 | Status: ACTIVE | COMMUNITY
Start: 2021-05-21 | End: 1900-01-01

## 2021-05-21 NOTE — PHYSICAL EXAM
[General Appearance - Well Developed] : well developed [General Appearance - Well Nourished] : well nourished [Normal Appearance] : normal appearance [Well Groomed] : well groomed [General Appearance - In No Acute Distress] : no acute distress [Abdomen Soft] : soft [Abdomen Tenderness] : non-tender [Costovertebral Angle Tenderness] : no ~M costovertebral angle tenderness [Penis Abnormality] : normal circumcised penis [Urethral Meatus] : meatus normal [Urinary Bladder Findings] : the bladder was normal on palpation [Scrotum] : the scrotum was normal [Epididymis] : the epididymides were normal [Testes Tenderness] : no tenderness of the testes [Testes Mass (___cm)] : there were no testicular masses [Edema] : no peripheral edema [] : no respiratory distress [Respiration, Rhythm And Depth] : normal respiratory rhythm and effort [Exaggerated Use Of Accessory Muscles For Inspiration] : no accessory muscle use [Oriented To Time, Place, And Person] : oriented to person, place, and time [Affect] : the affect was normal [Mood] : the mood was normal [Not Anxious] : not anxious [Normal Station and Gait] : the gait and station were normal for the patient's age [No Focal Deficits] : no focal deficits [No Palpable Adenopathy] : no palpable adenopathy [FreeTextEntry1] : deferred today

## 2021-05-21 NOTE — HISTORY OF PRESENT ILLNESS
[FreeTextEntry1] : 76 yo Italian speaking M presents with longstanding history of BPH\par Multiple episodes of acute urinary retention\par First episode was about 7-8 months ago\par Second episode was 3 months ago\par At that time, underwent TUMT in the office of an outside urologist (Dr. Hernandez)\par Obstructive LUTS didn't improve much afterwards\par Most recently about 2 days ago, wasn't able to urinate\par Went to urgent care who was able to place a daly catheter\par Throughout this whole time, has just been taking tamsulosin 0.4mg\par \par 4/21/21 Interval history: No issues since last visit\par Started finasteride and increased tamsulosin\par Passed TOV today\par \par 5/5/21 Interval history: The day after pt passed TOV, went into retention again\par Went to ER and had catheter replaced\par Cystoscopy today confirmed enlarged prostate with intravesical component\par \par 5/19/21 Interval history: Pt is s/p TURP yesterday\par Presented today with more drainage around catheter instead of through\par Irrigated catheter which was red but no clots\par Decision made to proceed with TOV today and pt was able to void\par \par 5/21/21 Interval history: Doing well since catheter removal\par Has been ahving urinary frequency and urgency \par Urine has mostly cleared up\par PVR today was 28ml\par No BM since discharge\par

## 2021-05-21 NOTE — ASSESSMENT
[FreeTextEntry1] : 76 yo M s/p TURP for BPH\par \par - PVR = 28ml\par - Colace Rx for constipation\par - FU in 1 month

## 2021-05-25 LAB — SURGICAL PATHOLOGY STUDY: SIGNIFICANT CHANGE UP

## 2021-06-07 ENCOUNTER — APPOINTMENT (OUTPATIENT)
Age: 76
End: 2021-06-07
Payer: MEDICARE

## 2021-06-07 DIAGNOSIS — R42 DIZZINESS AND GIDDINESS: ICD-10-CM

## 2021-06-07 PROCEDURE — 99024 POSTOP FOLLOW-UP VISIT: CPT

## 2021-06-07 RX ORDER — TAMSULOSIN HYDROCHLORIDE 0.4 MG/1
0.4 CAPSULE ORAL
Qty: 180 | Refills: 3 | Status: DISCONTINUED | COMMUNITY
Start: 2021-04-09 | End: 2021-06-07

## 2021-06-08 LAB
APPEARANCE: ABNORMAL
BACTERIA: NEGATIVE
BASOPHILS # BLD AUTO: 0.03 K/UL
BASOPHILS NFR BLD AUTO: 0.4 %
BILIRUBIN URINE: NEGATIVE
BLOOD URINE: ABNORMAL
COLOR: ABNORMAL
EOSINOPHIL # BLD AUTO: 0.09 K/UL
EOSINOPHIL NFR BLD AUTO: 1.3 %
GLUCOSE QUALITATIVE U: NORMAL
HCT VFR BLD CALC: 45.7 %
HGB BLD-MCNC: 14.4 G/DL
HYALINE CASTS: 5 /LPF
IMM GRANULOCYTES NFR BLD AUTO: 0.3 %
KETONES URINE: NEGATIVE
LEUKOCYTE ESTERASE URINE: ABNORMAL
LYMPHOCYTES # BLD AUTO: 1.09 K/UL
LYMPHOCYTES NFR BLD AUTO: 16.1 %
MAN DIFF?: NORMAL
MCHC RBC-ENTMCNC: 29.8 PG
MCHC RBC-ENTMCNC: 31.5 GM/DL
MCV RBC AUTO: 94.6 FL
MICROSCOPIC-UA: NORMAL
MONOCYTES # BLD AUTO: 0.45 K/UL
MONOCYTES NFR BLD AUTO: 6.7 %
NEUTROPHILS # BLD AUTO: 5.07 K/UL
NEUTROPHILS NFR BLD AUTO: 75.2 %
NITRITE URINE: NEGATIVE
PH URINE: 6
PLATELET # BLD AUTO: 230 K/UL
PROTEIN URINE: ABNORMAL
RBC # BLD: 4.83 M/UL
RBC # FLD: 14.5 %
RED BLOOD CELLS URINE: >720 /HPF
SPECIFIC GRAVITY URINE: 1.02
SQUAMOUS EPITHELIAL CELLS: 0 /HPF
UROBILINOGEN URINE: NORMAL
WBC # FLD AUTO: 6.75 K/UL
WHITE BLOOD CELLS URINE: 42 /HPF

## 2021-06-08 NOTE — HISTORY OF PRESENT ILLNESS
[FreeTextEntry1] : 74 yo Hungarian speaking M presents with longstanding history of BPH\par Multiple episodes of acute urinary retention\par First episode was about 7-8 months ago\par Second episode was 3 months ago\par At that time, underwent TUMT in the office of an outside urologist (Dr. Hernandez)\par Obstructive LUTS didn't improve much afterwards\par Most recently about 2 days ago, wasn't able to urinate\par Went to urgent care who was able to place a daly catheter\par Throughout this whole time, has just been taking tamsulosin 0.4mg\par \par 4/21/21 Interval history: No issues since last visit\par Started finasteride and increased tamsulosin\par Passed TOV today\par \par 5/5/21 Interval history: The day after pt passed TOV, went into retention again\par Went to ER and had catheter replaced\par Cystoscopy today confirmed enlarged prostate with intravesical component\par \par 5/19/21 Interval history: Pt is s/p TURP yesterday\par Presented today with more drainage around catheter instead of through\par Irrigated catheter which was red but no clots\par Decision made to proceed with TOV today and pt was able to void\par \par 5/21/21 Interval history: Doing well since catheter removal\par Has been ahving urinary frequency and urgency \par Urine has mostly cleared up\par PVR today was 28ml\par No BM since discharge\par \par 6/7/21 Interval history: Voiding well but has been having persistent hematuria\par Felt dizzy the other day\par No fever, chills

## 2021-06-08 NOTE — PHYSICAL EXAM
[General Appearance - Well Developed] : well developed [General Appearance - Well Nourished] : well nourished [Normal Appearance] : normal appearance [Well Groomed] : well groomed [General Appearance - In No Acute Distress] : no acute distress [Abdomen Soft] : soft [Abdomen Tenderness] : non-tender [Costovertebral Angle Tenderness] : no ~M costovertebral angle tenderness [Urethral Meatus] : meatus normal [Penis Abnormality] : normal circumcised penis [Urinary Bladder Findings] : the bladder was normal on palpation [Scrotum] : the scrotum was normal [Epididymis] : the epididymides were normal [Testes Tenderness] : no tenderness of the testes [Testes Mass (___cm)] : there were no testicular masses [FreeTextEntry1] : deferred today [Edema] : no peripheral edema [] : no respiratory distress [Respiration, Rhythm And Depth] : normal respiratory rhythm and effort [Exaggerated Use Of Accessory Muscles For Inspiration] : no accessory muscle use [Oriented To Time, Place, And Person] : oriented to person, place, and time [Affect] : the affect was normal [Mood] : the mood was normal [Not Anxious] : not anxious [Normal Station and Gait] : the gait and station were normal for the patient's age [No Focal Deficits] : no focal deficits [No Palpable Adenopathy] : no palpable adenopathy

## 2021-06-08 NOTE — ASSESSMENT
[FreeTextEntry1] : 74 yo M s/p TURP, hematuria\par \par - PVR = 22ml\par - Stop tamsulosin as this may exacerbate dizziness\par - UA, culture\par - CBC to check for anemia

## 2021-06-09 ENCOUNTER — APPOINTMENT (OUTPATIENT)
Age: 76
End: 2021-06-09
Payer: MEDICARE

## 2021-06-09 VITALS — TEMPERATURE: 98 F

## 2021-06-09 LAB — BACTERIA UR CULT: NORMAL

## 2021-06-09 PROCEDURE — 99024 POSTOP FOLLOW-UP VISIT: CPT

## 2021-06-13 RX ORDER — CEPHALEXIN 500 MG/1
500 TABLET ORAL
Qty: 6 | Refills: 0 | Status: COMPLETED | COMMUNITY
Start: 2021-05-21 | End: 2021-06-13

## 2021-06-13 NOTE — ASSESSMENT
[FreeTextEntry1] : 74 yo M with hematuria s/p TURP\par \par - Reviewed lab results including normal H/H, negative urine culture\par - Overall, pt happy that he is voiding well since procedure but remains worried about hematuria. Reassured pt that at this time, no further workup indicated and continue observation\par - Continue to stop tamsulosin\par - Reviewed indications to seek immediate medical attention\par

## 2021-06-13 NOTE — HISTORY OF PRESENT ILLNESS
[FreeTextEntry1] : 74 yo Indonesian speaking M presents with longstanding history of BPH\par Multiple episodes of acute urinary retention\par First episode was about 7-8 months ago\par Second episode was 3 months ago\par At that time, underwent TUMT in the office of an outside urologist (Dr. Hernandez)\par Obstructive LUTS didn't improve much afterwards\par Most recently about 2 days ago, wasn't able to urinate\par Went to urgent care who was able to place a daly catheter\par Throughout this whole time, has just been taking tamsulosin 0.4mg\par \par 4/21/21 Interval history: No issues since last visit\par Started finasteride and increased tamsulosin\par Passed TOV today\par \par 5/5/21 Interval history: The day after pt passed TOV, went into retention again\par Went to ER and had catheter replaced\par Cystoscopy today confirmed enlarged prostate with intravesical component\par \par 5/19/21 Interval history: Pt is s/p TURP yesterday\par Presented today with more drainage around catheter instead of through\par Irrigated catheter which was red but no clots\par Decision made to proceed with TOV today and pt was able to void\par \par 5/21/21 Interval history: Doing well since catheter removal\par Has been ahving urinary frequency and urgency \par Urine has mostly cleared up\par PVR today was 28ml\par No BM since discharge\par \par 6/7/21 Interval history: Voiding well but has been having persistent hematuria\par Felt dizzy the other day\par No fever, chills\par \par 6/9/21 Interval history: Here to review lab results\par Stopped tamsulosin at last visit\par CBC showed no evidence of anemia and negative culture

## 2021-06-13 NOTE — PHYSICAL EXAM
[General Appearance - Well Developed] : well developed [General Appearance - Well Nourished] : well nourished [Normal Appearance] : normal appearance [Well Groomed] : well groomed [General Appearance - In No Acute Distress] : no acute distress [Abdomen Soft] : soft [Abdomen Tenderness] : non-tender [Costovertebral Angle Tenderness] : no ~M costovertebral angle tenderness [Urethral Meatus] : meatus normal [Penis Abnormality] : normal circumcised penis [Urinary Bladder Findings] : the bladder was normal on palpation [Scrotum] : the scrotum was normal [Epididymis] : the epididymides were normal [Testes Tenderness] : no tenderness of the testes [Testes Mass (___cm)] : there were no testicular masses [FreeTextEntry1] : deferred today [Edema] : no peripheral edema [] : no respiratory distress [Respiration, Rhythm And Depth] : normal respiratory rhythm and effort [Oriented To Time, Place, And Person] : oriented to person, place, and time [Exaggerated Use Of Accessory Muscles For Inspiration] : no accessory muscle use [Affect] : the affect was normal [Mood] : the mood was normal [Not Anxious] : not anxious [Normal Station and Gait] : the gait and station were normal for the patient's age [No Focal Deficits] : no focal deficits [No Palpable Adenopathy] : no palpable adenopathy

## 2021-07-07 ENCOUNTER — APPOINTMENT (OUTPATIENT)
Age: 76
End: 2021-07-07
Payer: MEDICARE

## 2021-07-07 PROCEDURE — 99024 POSTOP FOLLOW-UP VISIT: CPT

## 2021-07-07 NOTE — HISTORY OF PRESENT ILLNESS
[FreeTextEntry1] : 76 yo Telugu speaking M presents with longstanding history of BPH\par Multiple episodes of acute urinary retention\par First episode was about 7-8 months ago\par Second episode was 3 months ago\par At that time, underwent TUMT in the office of an outside urologist (Dr. Hernandez)\par Obstructive LUTS didn't improve much afterwards\par Most recently about 2 days ago, wasn't able to urinate\par Went to urgent care who was able to place a daly catheter\par Throughout this whole time, has just been taking tamsulosin 0.4mg\par \par 4/21/21 Interval history: No issues since last visit\par Started finasteride and increased tamsulosin\par Passed TOV today\par \par 5/5/21 Interval history: The day after pt passed TOV, went into retention again\par Went to ER and had catheter replaced\par Cystoscopy today confirmed enlarged prostate with intravesical component\par \par 5/19/21 Interval history: Pt is s/p TURP yesterday\par Presented today with more drainage around catheter instead of through\par Irrigated catheter which was red but no clots\par Decision made to proceed with TOV today and pt was able to void\par \par 5/21/21 Interval history: Doing well since catheter removal\par Has been ahving urinary frequency and urgency \par Urine has mostly cleared up\par PVR today was 28ml\par No BM since discharge\par \par 6/7/21 Interval history: Voiding well but has been having persistent hematuria\par Felt dizzy the other day\par No fever, chills\par \par 6/9/21 Interval history: Here to review lab results\par Stopped tamsulosin at last visit\par CBC showed no evidence of anemia and negative culture\par \par 7/7/21 Interval history: Hematuria has resolved for about 2 weeks\par Still with some intermittent dysuria\par Drinking plenty of fluids\par No more dizziness

## 2021-10-20 ENCOUNTER — APPOINTMENT (OUTPATIENT)
Age: 76
End: 2021-10-20
Payer: MEDICARE

## 2021-10-20 VITALS
DIASTOLIC BLOOD PRESSURE: 69 MMHG | OXYGEN SATURATION: 99 % | HEART RATE: 71 BPM | RESPIRATION RATE: 16 BRPM | SYSTOLIC BLOOD PRESSURE: 145 MMHG

## 2021-10-20 PROCEDURE — 76705 ECHO EXAM OF ABDOMEN: CPT

## 2021-10-20 PROCEDURE — 99213 OFFICE O/P EST LOW 20 MIN: CPT | Mod: 25

## 2021-10-24 NOTE — HISTORY OF PRESENT ILLNESS
[FreeTextEntry1] : 74 yo Serbian speaking M presents with longstanding history of BPH\par Multiple episodes of acute urinary retention\par First episode was about 7-8 months ago\par Second episode was 3 months ago\par At that time, underwent TUMT in the office of an outside urologist (Dr. Hernandez)\par Obstructive LUTS didn't improve much afterwards\par Most recently about 2 days ago, wasn't able to urinate\par Went to urgent care who was able to place a daly catheter\par Throughout this whole time, has just been taking tamsulosin 0.4mg\par \par 4/21/21 Interval history: No issues since last visit\par Started finasteride and increased tamsulosin\par Passed TOV today\par \par 5/5/21 Interval history: The day after pt passed TOV, went into retention again\par Went to ER and had catheter replaced\par Cystoscopy today confirmed enlarged prostate with intravesical component\par \par 5/19/21 Interval history: Pt is s/p TURP yesterday\par Presented today with more drainage around catheter instead of through\par Irrigated catheter which was red but no clots\par Decision made to proceed with TOV today and pt was able to void\par \par 5/21/21 Interval history: Doing well since catheter removal\par Has been ahving urinary frequency and urgency \par Urine has mostly cleared up\par PVR today was 28ml\par No BM since discharge\par \par 6/7/21 Interval history: Voiding well but has been having persistent hematuria\par Felt dizzy the other day\par No fever, chills\par \par 6/9/21 Interval history: Here to review lab results\par Stopped tamsulosin at last visit\par CBC showed no evidence of anemia and negative culture\par \par 7/7/21 Interval history: Hematuria has resolved for about 2 weeks\par Still with some intermittent dysuria\par Drinking plenty of fluids\par No more dizziness\par \par 10/20/21 Interval history: no more hematuria, no more dysuria\par overall doing well\par Some left flank pain intermittently for the last month\par Renal US today was unremarkable

## 2021-10-24 NOTE — ASSESSMENT
[FreeTextEntry1] : 77 yo M with history of BPH s/p TURP, left flank pain\par \par - PVR = 28ml\par - Reviewed today's renal US with pt. Flank pain unlikely to be due to  issues\par - FU in 6 months

## 2021-10-24 NOTE — PHYSICAL EXAM
[General Appearance - Well Developed] : well developed [General Appearance - Well Nourished] : well nourished [Normal Appearance] : normal appearance [Well Groomed] : well groomed [General Appearance - In No Acute Distress] : no acute distress [Abdomen Soft] : soft [Abdomen Tenderness] : non-tender [Costovertebral Angle Tenderness] : no ~M costovertebral angle tenderness [Urethral Meatus] : meatus normal [Penis Abnormality] : normal circumcised penis [Urinary Bladder Findings] : the bladder was normal on palpation [Scrotum] : the scrotum was normal [Epididymis] : the epididymides were normal [Testes Tenderness] : no tenderness of the testes [Testes Mass (___cm)] : there were no testicular masses [Edema] : no peripheral edema [] : no respiratory distress [Respiration, Rhythm And Depth] : normal respiratory rhythm and effort [Exaggerated Use Of Accessory Muscles For Inspiration] : no accessory muscle use [Oriented To Time, Place, And Person] : oriented to person, place, and time [Affect] : the affect was normal [Mood] : the mood was normal [Not Anxious] : not anxious [Normal Station and Gait] : the gait and station were normal for the patient's age [No Focal Deficits] : no focal deficits [No Palpable Adenopathy] : no palpable adenopathy [FreeTextEntry1] : deferred today

## 2022-04-20 ENCOUNTER — APPOINTMENT (OUTPATIENT)
Age: 77
End: 2022-04-20
Payer: MEDICARE

## 2022-04-20 VITALS
RESPIRATION RATE: 16 BRPM | HEART RATE: 78 BPM | HEIGHT: 67 IN | DIASTOLIC BLOOD PRESSURE: 70 MMHG | SYSTOLIC BLOOD PRESSURE: 130 MMHG | OXYGEN SATURATION: 97 % | TEMPERATURE: 97.6 F

## 2022-04-20 PROCEDURE — 99213 OFFICE O/P EST LOW 20 MIN: CPT

## 2022-04-20 NOTE — ASSESSMENT
[FreeTextEntry1] : 75 yo M with BPH s/p TURP\par \par - PVR = 5ml\par - Discussed pros and cons of continuing finasteride. Pt will continue for now\par - FU in 6 months

## 2022-04-20 NOTE — PHYSICAL EXAM
[General Appearance - Well Developed] : well developed [General Appearance - Well Nourished] : well nourished [Normal Appearance] : normal appearance [Well Groomed] : well groomed [General Appearance - In No Acute Distress] : no acute distress [Abdomen Soft] : soft [Abdomen Tenderness] : non-tender [Costovertebral Angle Tenderness] : no ~M costovertebral angle tenderness [Urethral Meatus] : meatus normal [Penis Abnormality] : normal circumcised penis [Urinary Bladder Findings] : the bladder was normal on palpation [Scrotum] : the scrotum was normal [Epididymis] : the epididymides were normal [Testes Tenderness] : no tenderness of the testes [Testes Mass (___cm)] : there were no testicular masses [Prostate Tenderness] : the prostate was not tender [No Prostate Nodules] : no prostate nodules [Prostate Size ___ gm] : prostate size [unfilled] gm [Edema] : no peripheral edema [Respiration, Rhythm And Depth] : normal respiratory rhythm and effort [] : no respiratory distress [Exaggerated Use Of Accessory Muscles For Inspiration] : no accessory muscle use [Oriented To Time, Place, And Person] : oriented to person, place, and time [Affect] : the affect was normal [Mood] : the mood was normal [Not Anxious] : not anxious [Normal Station and Gait] : the gait and station were normal for the patient's age [No Focal Deficits] : no focal deficits [No Palpable Adenopathy] : no palpable adenopathy

## 2022-04-20 NOTE — HISTORY OF PRESENT ILLNESS
[FreeTextEntry1] : 74 yo Polish speaking M presents with longstanding history of BPH\par Multiple episodes of acute urinary retention\par First episode was about 7-8 months ago\par Second episode was 3 months ago\par At that time, underwent TUMT in the office of an outside urologist (Dr. Hernandez)\par Obstructive LUTS didn't improve much afterwards\par Most recently about 2 days ago, wasn't able to urinate\par Went to urgent care who was able to place a daly catheter\par Throughout this whole time, has just been taking tamsulosin 0.4mg\par \par 4/21/21 Interval history: No issues since last visit\par Started finasteride and increased tamsulosin\par Passed TOV today\par \par 5/5/21 Interval history: The day after pt passed TOV, went into retention again\par Went to ER and had catheter replaced\par Cystoscopy today confirmed enlarged prostate with intravesical component\par \par 5/19/21 Interval history: Pt is s/p TURP yesterday\par Presented today with more drainage around catheter instead of through\par Irrigated catheter which was red but no clots\par Decision made to proceed with TOV today and pt was able to void\par \par 5/21/21 Interval history: Doing well since catheter removal\par Has been ahving urinary frequency and urgency \par Urine has mostly cleared up\par PVR today was 28ml\par No BM since discharge\par \par 6/7/21 Interval history: Voiding well but has been having persistent hematuria\par Felt dizzy the other day\par No fever, chills\par \par 6/9/21 Interval history: Here to review lab results\par Stopped tamsulosin at last visit\par CBC showed no evidence of anemia and negative culture\par \par 7/7/21 Interval history: Hematuria has resolved for about 2 weeks\par Still with some intermittent dysuria\par Drinking plenty of fluids\par No more dizziness\par \par 10/20/21 Interval history: no more hematuria, no more dysuria\par overall doing well\par Some left flank pain intermittently for the last month\par Renal US today was unremarkable\par \par 4/20/22 Interval history: No issues since last visit\par No gross hematuria\par Noticed a slight worsening of flow\par Hasn't been taking finasteride

## 2022-10-21 ENCOUNTER — APPOINTMENT (OUTPATIENT)
Age: 77
End: 2022-10-21

## 2022-10-21 VITALS
RESPIRATION RATE: 18 BRPM | TEMPERATURE: 97.6 F | DIASTOLIC BLOOD PRESSURE: 76 MMHG | OXYGEN SATURATION: 96 % | SYSTOLIC BLOOD PRESSURE: 150 MMHG | HEART RATE: 75 BPM

## 2022-10-21 DIAGNOSIS — R33.8 OTHER RETENTION OF URINE: ICD-10-CM

## 2022-10-21 DIAGNOSIS — Z87.448 PERSONAL HISTORY OF OTHER DISEASES OF URINARY SYSTEM: ICD-10-CM

## 2022-10-21 DIAGNOSIS — A49.9 URINARY TRACT INFECTION, SITE NOT SPECIFIED: ICD-10-CM

## 2022-10-21 DIAGNOSIS — N13.8 BENIGN PROSTATIC HYPERPLASIA WITH LOWER URINARY TRACT SYMPMS: ICD-10-CM

## 2022-10-21 DIAGNOSIS — N40.1 BENIGN PROSTATIC HYPERPLASIA WITH LOWER URINARY TRACT SYMPMS: ICD-10-CM

## 2022-10-21 DIAGNOSIS — N39.0 URINARY TRACT INFECTION, SITE NOT SPECIFIED: ICD-10-CM

## 2022-10-21 DIAGNOSIS — Z87.898 PERSONAL HISTORY OF OTHER SPECIFIED CONDITIONS: ICD-10-CM

## 2022-10-21 PROCEDURE — 99213 OFFICE O/P EST LOW 20 MIN: CPT

## 2022-10-21 RX ORDER — SULFAMETHOXAZOLE AND TRIMETHOPRIM 800; 160 MG/1; MG/1
800-160 TABLET ORAL
Qty: 14 | Refills: 0 | Status: COMPLETED | COMMUNITY
Start: 2021-05-17 | End: 2022-10-21

## 2022-10-24 PROBLEM — Z87.898 HISTORY OF LEFT FLANK PAIN: Status: RESOLVED | Noted: 2021-10-20 | Resolved: 2022-10-24

## 2022-10-24 PROBLEM — Z87.448 HISTORY OF GROSS HEMATURIA: Status: RESOLVED | Noted: 2021-06-13 | Resolved: 2022-10-24

## 2022-10-24 PROBLEM — R33.8 ACUTE URINARY RETENTION: Status: RESOLVED | Noted: 2021-04-11 | Resolved: 2022-10-24

## 2022-10-24 PROBLEM — N40.1 BPH WITH OBSTRUCTION/LOWER URINARY TRACT SYMPTOMS: Status: ACTIVE | Noted: 2021-04-09

## 2022-10-24 PROBLEM — N39.0 BACTERIAL UTI: Status: RESOLVED | Noted: 2021-05-17 | Resolved: 2022-10-24

## 2022-10-24 LAB
APPEARANCE: CLEAR
BILIRUBIN URINE: NEGATIVE
BLOOD URINE: NEGATIVE
COLOR: NORMAL
GLUCOSE QUALITATIVE U: ABNORMAL
KETONES URINE: NEGATIVE
LEUKOCYTE ESTERASE URINE: NEGATIVE
NITRITE URINE: NEGATIVE
PH URINE: 7
PROTEIN URINE: NEGATIVE
SPECIFIC GRAVITY URINE: 1.02
UROBILINOGEN URINE: NORMAL

## 2022-10-24 NOTE — ASSESSMENT
[FreeTextEntry1] : 78 yo M with stable chronic BPH\par \par - PVR = 2ml\par - UA\par - Continue finasteride\par - FU in 6 months

## 2022-10-24 NOTE — HISTORY OF PRESENT ILLNESS
[FreeTextEntry1] : 76 yo Hungarian speaking M presents with longstanding history of BPH\par Multiple episodes of acute urinary retention\par First episode was about 7-8 months ago\par Second episode was 3 months ago\par At that time, underwent TUMT in the office of an outside urologist (Dr. Hernandez)\par Obstructive LUTS didn't improve much afterwards\par Most recently about 2 days ago, wasn't able to urinate\par Went to urgent care who was able to place a daly catheter\par Throughout this whole time, has just been taking tamsulosin 0.4mg\par \par 4/21/21 Interval history: No issues since last visit\par Started finasteride and increased tamsulosin\par Passed TOV today\par \par 5/5/21 Interval history: The day after pt passed TOV, went into retention again\par Went to ER and had catheter replaced\par Cystoscopy today confirmed enlarged prostate with intravesical component\par \par 5/19/21 Interval history: Pt is s/p TURP yesterday\par Presented today with more drainage around catheter instead of through\par Irrigated catheter which was red but no clots\par Decision made to proceed with TOV today and pt was able to void\par \par 5/21/21 Interval history: Doing well since catheter removal\par Has been ahving urinary frequency and urgency \par Urine has mostly cleared up\par PVR today was 28ml\par No BM since discharge\par \par 6/7/21 Interval history: Voiding well but has been having persistent hematuria\par Felt dizzy the other day\par No fever, chills\par \par 6/9/21 Interval history: Here to review lab results\par Stopped tamsulosin at last visit\par CBC showed no evidence of anemia and negative culture\par \par 7/7/21 Interval history: Hematuria has resolved for about 2 weeks\par Still with some intermittent dysuria\par Drinking plenty of fluids\par No more dizziness\par \par 10/20/21 Interval history: no more hematuria, no more dysuria\par overall doing well\par Some left flank pain intermittently for the last month\par Renal US today was unremarkable\par \par 4/20/22 Interval history: No issues since last visit\par No gross hematuria\par Noticed a slight worsening of flow\par Hasn't been taking finasteride\par \par 10/21/22 Interval history: Doing well from urinary standpoint\par Some urinary hesitancy\par No gross hematuria\par Taking finasteride but admits not consistently\par Still having some issues with constipation

## 2023-04-21 ENCOUNTER — APPOINTMENT (OUTPATIENT)
Age: 78
End: 2023-04-21

## 2024-08-16 ENCOUNTER — APPOINTMENT (OUTPATIENT)
Dept: UROLOGY | Facility: CLINIC | Age: 79
End: 2024-08-16
Payer: MEDICARE

## 2024-08-16 VITALS
OXYGEN SATURATION: 100 % | HEART RATE: 72 BPM | DIASTOLIC BLOOD PRESSURE: 74 MMHG | TEMPERATURE: 97.7 F | SYSTOLIC BLOOD PRESSURE: 151 MMHG

## 2024-08-16 DIAGNOSIS — R97.20 ELEVATED PROSTATE, SPECIFIC ANTIGEN [PSA]: ICD-10-CM

## 2024-08-16 DIAGNOSIS — N13.8 BENIGN PROSTATIC HYPERPLASIA WITH LOWER URINARY TRACT SYMPMS: ICD-10-CM

## 2024-08-16 DIAGNOSIS — N40.1 BENIGN PROSTATIC HYPERPLASIA WITH LOWER URINARY TRACT SYMPMS: ICD-10-CM

## 2024-08-16 PROCEDURE — G2211 COMPLEX E/M VISIT ADD ON: CPT

## 2024-08-16 PROCEDURE — 99214 OFFICE O/P EST MOD 30 MIN: CPT

## 2024-08-16 NOTE — HISTORY OF PRESENT ILLNESS
[FreeTextEntry1] : 74 yo German speaking M presents with longstanding history of BPH Multiple episodes of acute urinary retention First episode was about 7-8 months ago Second episode was 3 months ago At that time, underwent TUMT in the office of an outside urologist (Dr. Hernandez) Obstructive LUTS didn't improve much afterwards Most recently about 2 days ago, wasn't able to urinate Went to urgent care who was able to place a daly catheter Throughout this whole time, has just been taking tamsulosin 0.4mg  4/21/21 Interval history: No issues since last visit Started finasteride and increased tamsulosin Passed TOV today  5/5/21 Interval history: The day after pt passed TOV, went into retention again Went to ER and had catheter replaced Cystoscopy today confirmed enlarged prostate with intravesical component  5/19/21 Interval history: Pt is s/p TURP yesterday Presented today with more drainage around catheter instead of through Irrigated catheter which was red but no clots Decision made to proceed with TOV today and pt was able to void  5/21/21 Interval history: Doing well since catheter removal Has been ahving urinary frequency and urgency  Urine has mostly cleared up PVR today was 28ml No BM since discharge  6/7/21 Interval history: Voiding well but has been having persistent hematuria Felt dizzy the other day No fever, chills  6/9/21 Interval history: Here to review lab results Stopped tamsulosin at last visit CBC showed no evidence of anemia and negative culture  7/7/21 Interval history: Hematuria has resolved for about 2 weeks Still with some intermittent dysuria Drinking plenty of fluids No more dizziness  10/20/21 Interval history: no more hematuria, no more dysuria overall doing well Some left flank pain intermittently for the last month Renal US today was unremarkable  4/20/22 Interval history: No issues since last visit No gross hematuria Noticed a slight worsening of flow Hasn't been taking finasteride  10/21/22 Interval history: Doing well from urinary standpoint Some urinary hesitancy No gross hematuria Taking finasteride but admits not consistently Still having some issues with constipation  8/16/24 Interval history: Pt hasn't been seen since 2022 Was doing well with no issues but recently admitted to hospital after a fall Discharged from rehab at the end of July Recent blood work showed PSA of 55.2 with PCP At that time, was also having some dysuria and hematuria Currently on Cipro and symptoms have resolved no fever, chills Not taking any BPH meds at this time and pt states he feels like he is emptying well No urinary retention since last visit

## 2024-08-16 NOTE — ASSESSMENT
[FreeTextEntry1] : 77 yo M with BPH, elevated PSA  - PVR = 120ml - Finish Cipro - Repeat PSA in 2-3 weeks to make sure decreasing - Discussed possible etiologies for elevated PSA including benign vs. malignancy - Discussed pros and cons of proceeding with a prostate needle biopsy. Discussed risk and benefits including infection, hematochezia, hematuria, hematospermia as well as specificity and sensitivity of the biopsy.  - Discussed the role of prostate MRI in the workup of elevated PSA as well - If PSA hasn't decreased, will plan to proceed with prostate MRI to check for targetable lesions in anticipation of fusion biopsy

## 2024-08-16 NOTE — PHYSICAL EXAM
[Normal Appearance] : normal appearance [Well Groomed] : well groomed [General Appearance - In No Acute Distress] : no acute distress [Edema] : no peripheral edema [Exaggerated Use Of Accessory Muscles For Inspiration] : no accessory muscle use [Respiration, Rhythm And Depth] : normal respiratory rhythm and effort [Abdomen Soft] : soft [Abdomen Tenderness] : non-tender [Costovertebral Angle Tenderness] : no ~M costovertebral angle tenderness [Urinary Bladder Findings] : the bladder was normal on palpation [] : no rash [No Focal Deficits] : no focal deficits [Oriented To Time, Place, And Person] : oriented to person, place, and time [Mood] : the mood was normal [Affect] : the affect was normal [No Palpable Adenopathy] : no palpable adenopathy [de-identified] : wheelchair

## 2024-08-28 ENCOUNTER — LABORATORY RESULT (OUTPATIENT)
Age: 79
End: 2024-08-28

## 2024-08-30 ENCOUNTER — APPOINTMENT (OUTPATIENT)
Age: 79
End: 2024-08-30
Payer: MEDICARE

## 2024-08-30 VITALS — DIASTOLIC BLOOD PRESSURE: 70 MMHG | HEART RATE: 73 BPM | SYSTOLIC BLOOD PRESSURE: 147 MMHG | OXYGEN SATURATION: 98 %

## 2024-08-30 DIAGNOSIS — N13.8 BENIGN PROSTATIC HYPERPLASIA WITH LOWER URINARY TRACT SYMPMS: ICD-10-CM

## 2024-08-30 DIAGNOSIS — R97.20 ELEVATED PROSTATE, SPECIFIC ANTIGEN [PSA]: ICD-10-CM

## 2024-08-30 DIAGNOSIS — N40.1 BENIGN PROSTATIC HYPERPLASIA WITH LOWER URINARY TRACT SYMPMS: ICD-10-CM

## 2024-08-30 PROCEDURE — G2211 COMPLEX E/M VISIT ADD ON: CPT

## 2024-08-30 PROCEDURE — 99214 OFFICE O/P EST MOD 30 MIN: CPT

## 2024-08-30 NOTE — HISTORY OF PRESENT ILLNESS
[FreeTextEntry1] : 74 yo Italian speaking M presents with longstanding history of BPH Multiple episodes of acute urinary retention First episode was about 7-8 months ago Second episode was 3 months ago At that time, underwent TUMT in the office of an outside urologist (Dr. Hernandez) Obstructive LUTS didn't improve much afterwards Most recently about 2 days ago, wasn't able to urinate Went to urgent care who was able to place a daly catheter Throughout this whole time, has just been taking tamsulosin 0.4mg  4/21/21 Interval history: No issues since last visit Started finasteride and increased tamsulosin Passed TOV today  5/5/21 Interval history: The day after pt passed TOV, went into retention again Went to ER and had catheter replaced Cystoscopy today confirmed enlarged prostate with intravesical component  5/19/21 Interval history: Pt is s/p TURP yesterday Presented today with more drainage around catheter instead of through Irrigated catheter which was red but no clots Decision made to proceed with TOV today and pt was able to void  5/21/21 Interval history: Doing well since catheter removal Has been ahving urinary frequency and urgency  Urine has mostly cleared up PVR today was 28ml No BM since discharge  6/7/21 Interval history: Voiding well but has been having persistent hematuria Felt dizzy the other day No fever, chills  6/9/21 Interval history: Here to review lab results Stopped tamsulosin at last visit CBC showed no evidence of anemia and negative culture  7/7/21 Interval history: Hematuria has resolved for about 2 weeks Still with some intermittent dysuria Drinking plenty of fluids No more dizziness  10/20/21 Interval history: no more hematuria, no more dysuria overall doing well Some left flank pain intermittently for the last month Renal US today was unremarkable  4/20/22 Interval history: No issues since last visit No gross hematuria Noticed a slight worsening of flow Hasn't been taking finasteride  10/21/22 Interval history: Doing well from urinary standpoint Some urinary hesitancy No gross hematuria Taking finasteride but admits not consistently Still having some issues with constipation  8/16/24 Interval history: Pt hasn't been seen since 2022 Was doing well with no issues but recently admitted to hospital after a fall Discharged from rehab at the end of July Recent blood work showed PSA of 55.2 with PCP At that time, was also having some dysuria and hematuria Currently on Cipro and symptoms have resolved no fever, chills Not taking any BPH meds at this time and pt states he feels like he is emptying well No urinary retention since last visit  8/30/24 Interval history: Finished Cipro and feeling much better Repeat PSA showed significant improvement 15.7

## 2024-08-30 NOTE — ASSESSMENT
[FreeTextEntry1] : 77 yo M with elevated PSA, recent UTI, BPH  - Reviewed PSA results and confirmed decrease to 15.7 Will repeat in 1 month. No further workup as long as it is trending down - Discussed pros and cons of restarting finasteride. Rx transmitted - Will call pt with PSA results. FU in 3 months   Patient is being seen today for evaluation and management of a chronic and longitudinal ongoing condition and I am of the primary treating physician.

## 2024-08-30 NOTE — PHYSICAL EXAM
[Normal Appearance] : normal appearance [Well Groomed] : well groomed [General Appearance - In No Acute Distress] : no acute distress [Edema] : no peripheral edema [Respiration, Rhythm And Depth] : normal respiratory rhythm and effort [Exaggerated Use Of Accessory Muscles For Inspiration] : no accessory muscle use [Abdomen Soft] : soft [Abdomen Tenderness] : non-tender [Costovertebral Angle Tenderness] : no ~M costovertebral angle tenderness [Urinary Bladder Findings] : the bladder was normal on palpation [] : no rash [No Focal Deficits] : no focal deficits [Oriented To Time, Place, And Person] : oriented to person, place, and time [Affect] : the affect was normal [Mood] : the mood was normal [No Palpable Adenopathy] : no palpable adenopathy [de-identified] : walker

## 2024-12-06 ENCOUNTER — APPOINTMENT (OUTPATIENT)
Age: 79
End: 2024-12-06
Payer: MEDICARE

## 2024-12-06 VITALS
DIASTOLIC BLOOD PRESSURE: 80 MMHG | SYSTOLIC BLOOD PRESSURE: 158 MMHG | TEMPERATURE: 97.7 F | OXYGEN SATURATION: 98 % | HEART RATE: 83 BPM

## 2024-12-06 DIAGNOSIS — N40.1 BENIGN PROSTATIC HYPERPLASIA WITH LOWER URINARY TRACT SYMPMS: ICD-10-CM

## 2024-12-06 DIAGNOSIS — R97.20 ELEVATED PROSTATE, SPECIFIC ANTIGEN [PSA]: ICD-10-CM

## 2024-12-06 DIAGNOSIS — N13.8 BENIGN PROSTATIC HYPERPLASIA WITH LOWER URINARY TRACT SYMPMS: ICD-10-CM

## 2024-12-06 PROCEDURE — 99213 OFFICE O/P EST LOW 20 MIN: CPT

## 2024-12-06 PROCEDURE — G2211 COMPLEX E/M VISIT ADD ON: CPT

## 2024-12-10 LAB
APPEARANCE: CLEAR
BILIRUBIN URINE: NEGATIVE
BLOOD URINE: NEGATIVE
COLOR: YELLOW
GLUCOSE QUALITATIVE U: >=1000 MG/DL
KETONES URINE: NEGATIVE MG/DL
LEUKOCYTE ESTERASE URINE: NEGATIVE
NITRITE URINE: NEGATIVE
PH URINE: 6.5
PROTEIN URINE: NORMAL MG/DL
PSA SERPL-MCNC: 6.71 NG/ML
SPECIFIC GRAVITY URINE: >1.03
UROBILINOGEN URINE: 0.2 MG/DL

## 2025-03-07 ENCOUNTER — APPOINTMENT (OUTPATIENT)
Age: 80
End: 2025-03-07

## 2025-03-07 DIAGNOSIS — N40.1 BENIGN PROSTATIC HYPERPLASIA WITH LOWER URINARY TRACT SYMPMS: ICD-10-CM

## 2025-03-07 DIAGNOSIS — R97.20 ELEVATED PROSTATE, SPECIFIC ANTIGEN [PSA]: ICD-10-CM

## 2025-03-07 DIAGNOSIS — N13.8 BENIGN PROSTATIC HYPERPLASIA WITH LOWER URINARY TRACT SYMPMS: ICD-10-CM

## 2025-03-07 DIAGNOSIS — R31.0 GROSS HEMATURIA: ICD-10-CM

## 2025-03-07 PROCEDURE — 99214 OFFICE O/P EST MOD 30 MIN: CPT

## 2025-03-07 PROCEDURE — G2211 COMPLEX E/M VISIT ADD ON: CPT

## 2025-03-11 LAB
APPEARANCE: CLEAR
BACTERIA UR CULT: NORMAL
BACTERIA: NEGATIVE /HPF
BILIRUBIN URINE: NEGATIVE
BLOOD URINE: NEGATIVE
CAST: 0 /LPF
COLOR: YELLOW
EPITHELIAL CELLS: 0 /HPF
GLUCOSE QUALITATIVE U: >=1000 MG/DL
KETONES URINE: NEGATIVE MG/DL
LEUKOCYTE ESTERASE URINE: NEGATIVE
MICROSCOPIC-UA: NORMAL
NITRITE URINE: NEGATIVE
PH URINE: 7.5
PROTEIN URINE: NEGATIVE MG/DL
RED BLOOD CELLS URINE: 0 /HPF
SPECIFIC GRAVITY URINE: 1.02
URINE CYTOLOGY: NORMAL
UROBILINOGEN URINE: 0.2 MG/DL
WHITE BLOOD CELLS URINE: 0 /HPF

## 2025-03-16 PROBLEM — R31.0 GROSS HEMATURIA: Status: ACTIVE | Noted: 2025-03-07

## 2025-07-11 ENCOUNTER — APPOINTMENT (OUTPATIENT)
Age: 80
End: 2025-07-11
Payer: MEDICARE

## 2025-07-11 VITALS
WEIGHT: 110.23 LBS | DIASTOLIC BLOOD PRESSURE: 74 MMHG | HEART RATE: 71 BPM | SYSTOLIC BLOOD PRESSURE: 168 MMHG | BODY MASS INDEX: 17.26 KG/M2 | OXYGEN SATURATION: 96 % | TEMPERATURE: 97.8 F

## 2025-07-11 PROBLEM — Z87.898 HISTORY OF GROSS HEMATURIA: Status: RESOLVED | Noted: 2025-03-07 | Resolved: 2025-07-11

## 2025-07-11 PROCEDURE — 99214 OFFICE O/P EST MOD 30 MIN: CPT

## 2025-07-11 PROCEDURE — G2211 COMPLEX E/M VISIT ADD ON: CPT

## 2025-07-15 LAB — PSA SERPL-MCNC: 4.56 NG/ML
